# Patient Record
Sex: FEMALE | Race: BLACK OR AFRICAN AMERICAN | Employment: PART TIME | URBAN - METROPOLITAN AREA
[De-identification: names, ages, dates, MRNs, and addresses within clinical notes are randomized per-mention and may not be internally consistent; named-entity substitution may affect disease eponyms.]

---

## 2017-01-05 ENCOUNTER — ANESTHESIA EVENT (OUTPATIENT)
Dept: ENDOSCOPY | Age: 57
End: 2017-01-05
Payer: COMMERCIAL

## 2017-01-06 ENCOUNTER — ANESTHESIA (OUTPATIENT)
Dept: ENDOSCOPY | Age: 57
End: 2017-01-06
Payer: COMMERCIAL

## 2017-01-06 ENCOUNTER — SURGERY (OUTPATIENT)
Age: 57
End: 2017-01-06

## 2017-01-06 PROCEDURE — 74011000250 HC RX REV CODE- 250

## 2017-01-06 PROCEDURE — 74011250636 HC RX REV CODE- 250/636

## 2017-01-06 RX ORDER — LIDOCAINE HYDROCHLORIDE 20 MG/ML
INJECTION, SOLUTION EPIDURAL; INFILTRATION; INTRACAUDAL; PERINEURAL AS NEEDED
Status: DISCONTINUED | OUTPATIENT
Start: 2017-01-06 | End: 2017-01-06 | Stop reason: HOSPADM

## 2017-01-06 RX ORDER — PROPOFOL 10 MG/ML
INJECTION, EMULSION INTRAVENOUS AS NEEDED
Status: DISCONTINUED | OUTPATIENT
Start: 2017-01-06 | End: 2017-01-06 | Stop reason: HOSPADM

## 2017-01-06 RX ADMIN — PROPOFOL 20 MG: 10 INJECTION, EMULSION INTRAVENOUS at 09:55

## 2017-01-06 RX ADMIN — PROPOFOL 20 MG: 10 INJECTION, EMULSION INTRAVENOUS at 09:42

## 2017-01-06 RX ADMIN — PROPOFOL 20 MG: 10 INJECTION, EMULSION INTRAVENOUS at 09:59

## 2017-01-06 RX ADMIN — PROPOFOL 20 MG: 10 INJECTION, EMULSION INTRAVENOUS at 10:03

## 2017-01-06 RX ADMIN — PROPOFOL 20 MG: 10 INJECTION, EMULSION INTRAVENOUS at 09:47

## 2017-01-06 RX ADMIN — LIDOCAINE HYDROCHLORIDE 60 MG: 20 INJECTION, SOLUTION EPIDURAL; INFILTRATION; INTRACAUDAL; PERINEURAL at 09:31

## 2017-01-06 RX ADMIN — PROPOFOL 50 MG: 10 INJECTION, EMULSION INTRAVENOUS at 09:38

## 2017-01-06 RX ADMIN — PROPOFOL 20 MG: 10 INJECTION, EMULSION INTRAVENOUS at 09:40

## 2017-01-06 RX ADMIN — PROPOFOL 20 MG: 10 INJECTION, EMULSION INTRAVENOUS at 09:44

## 2017-01-06 RX ADMIN — PROPOFOL 20 MG: 10 INJECTION, EMULSION INTRAVENOUS at 09:51

## 2017-01-06 NOTE — ANESTHESIA POSTPROCEDURE EVALUATION
Post-Anesthesia Evaluation and Assessment    Patient: Christa Roach MRN: [id number]  SSN: [id number]    YOB: 1960  Age: 61 y.o. Sex: female       Cardiovascular Function/Vital Signs  Visit Vitals    /49 (BP 1 Location: Left arm, BP Patient Position: At rest)    Pulse (!) 53    Temp 36.3 °C (97.3 °F)    Resp 16    Ht 5' 6\" (1.676 m)    Wt 95.7 kg (211 lb)    SpO2 100%    BMI 34.06 kg/m2       Patient is status post MAC anesthesia for Procedure(s):  COLONOSCOPY w/bxs w/polypectomy. Nausea/Vomiting: None    Postoperative hydration reviewed and adequate. Pain:  Pain Scale 1: Numeric (0 - 10) (01/06/17 1043)  Pain Intensity 1: 0 (01/06/17 1043)   Managed    Neurological Status: At baseline    Mental Status and Level of Consciousness: Arousable    Pulmonary Status:   O2 Device: Room air (01/06/17 1014)   Adequate oxygenation and airway patent    Complications related to anesthesia: None    Post-anesthesia assessment completed.  No concerns    Signed By: Fonda Goldmann, MD     January 6, 2017

## 2017-01-06 NOTE — ANESTHESIA PREPROCEDURE EVALUATION
Anesthetic History   No history of anesthetic complications            Review of Systems / Medical History  Patient summary reviewed and pertinent labs reviewed    Pulmonary  Within defined limits                 Neuro/Psych   Within defined limits           Cardiovascular    Hypertension          Hyperlipidemia    Exercise tolerance: >4 METS     GI/Hepatic/Renal                Endo/Other        Arthritis     Other Findings   Comments: FLU SHOT received? YES       If NO, provide reason: Pt did not want a Flu shot    Current Smoker? NO       Elective Surgery? Yes       Abstained from smoking 24 hours prior to anesthesia? N/A    Risk Factors for Postoperative nausea/vomiting:       History of postoperative nausea/vomiting? NO       Female? YES       Motion sickness? NO       Intended opioid administration for postoperative analgesia?   NO           Physical Exam    Airway  Mallampati: III  TM Distance: 4 - 6 cm  Neck ROM: normal range of motion   Mouth opening: Normal     Cardiovascular    Rhythm: regular  Rate: normal         Dental  No notable dental hx       Pulmonary  Breath sounds clear to auscultation               Abdominal  GI exam deferred       Other Findings            Anesthetic Plan    ASA: 2  Anesthesia type: MAC            Anesthetic plan and risks discussed with: Patient

## 2018-04-24 ENCOUNTER — OFFICE VISIT (OUTPATIENT)
Dept: OBGYN CLINIC | Age: 58
End: 2018-04-24

## 2018-04-24 VITALS
HEART RATE: 62 BPM | DIASTOLIC BLOOD PRESSURE: 70 MMHG | HEIGHT: 66 IN | WEIGHT: 217 LBS | BODY MASS INDEX: 34.87 KG/M2 | SYSTOLIC BLOOD PRESSURE: 153 MMHG

## 2018-04-24 DIAGNOSIS — Z01.419 WELL WOMAN EXAM WITH ROUTINE GYNECOLOGICAL EXAM: Primary | ICD-10-CM

## 2018-04-24 NOTE — PATIENT INSTRUCTIONS
Pap Test: Care Instructions  Your Care Instructions    The Pap test (also called a Pap smear) is a screening test for cancer of the cervix, which is the lower part of the uterus that opens into the vagina. The test can help your doctor find early changes in the cells that could lead to cancer. The sample of cells taken during your test has been sent to a lab so that an expert can look at the cells. It usually takes a week or two to get the results back. Follow-up care is a key part of your treatment and safety. Be sure to make and go to all appointments, and call your doctor if you are having problems. It's also a good idea to know your test results and keep a list of the medicines you take. What do the results mean? · A normal result means that the test did not find any abnormal cells in the sample. · An abnormal result can mean many things. Most of these are not cancer. The results of your test may be abnormal because:  ¨ You have an infection of the vagina or cervix, such as a yeast infection. ¨ You have an IUD (intrauterine device for birth control). ¨ You have low estrogen levels after menopause that are causing the cells to change. ¨ You have cell changes that may be a sign of precancer or cancer. The results are ranked based on how serious the changes might be. There are many other reasons why you might not get a normal result. If the results were abnormal, you may need to get another test within a few weeks or months. If the results show changes that could be a sign of cancer, you may need a test called a colposcopy, which provides a more complete view of the cervix. Sometimes the lab cannot use the sample because it does not contain enough cells or was not preserved well. If so, you may need to have the test again. This is not common, but it does happen from time to time. When should you call for help?   Watch closely for changes in your health, and be sure to contact your doctor if:  ? · You have vaginal bleeding or pain for more than 2 days after the test. It is normal to have a small amount of bleeding for a day or two after the test.   Where can you learn more? Go to http://rob-jatin.info/. Enter U338 in the search box to learn more about \"Pap Test: Care Instructions. \"  Current as of: May 12, 2017  Content Version: 11.4  © 2935-8888 [employer]. Care instructions adapted under license by [employer] (which disclaims liability or warranty for this information). If you have questions about a medical condition or this instruction, always ask your healthcare professional. [address] any warranty or liability for your use of this information.

## 2018-04-24 NOTE — PROGRESS NOTES
Subjective:   61 y.o. female for Well Woman Check. She has no concerns today. Patient's last menstrual period was 08/17/2014. Social History: not sexually active. Pertinent past medical hstory: no history of HTN, DVT, CAD, DM, liver disease, migraines or smoking. Patient Active Problem List   Diagnosis Code    Dysmenorrhea N94.6    Exposure to STD Z20.2    H/O abnormal cervical Papanicolaou smear Z87.898     Current Outpatient Prescriptions   Medication Sig Dispense Refill    ASPIRIN/ACETAMINOPHEN/CAFFEINE (GOODY'S EXTRA STRENGTH PO) Take  by mouth as needed.  simvastatin (ZOCOR) 20 mg tablet Take 20 mg by mouth nightly.  lisinopril-hydrochlorothiazide (PRINZIDE, ZESTORETIC) 20-12.5 mg per tablet Take  by mouth daily. Allergies   Allergen Reactions    Latex, Natural Rubber Other (comments)     Latex gloves causes skin irritation      Amoxicillin Rash     Past Medical History:   Diagnosis Date    Dysmenorrhea 8/19/2014    Exposure to STD 11/18/2014    H/O abnormal cervical Papanicolaou smear 6/2/2015    High cholesterol     Hypertension      Past Surgical History:   Procedure Laterality Date    COLONOSCOPY N/A 1/6/2017    COLONOSCOPY w/bxs w/polypectomy performed by Luis Soto MD at [facility] ENDOSCOPY    HX BREAST REDUCTION Bilateral years ago 10+ years   [facility] HX DILATION AND CURETTAGE       Family History   Problem Relation Age of Onset    Cancer Sister 48     BREAST    Breast Cancer Sister     Diabetes Mother     Hypertension Mother     High Cholesterol Mother     Arthritis-osteo Mother     Stroke Mother     Kidney Disease Mother      DIALYSIS     Social History   Substance Use Topics    Smoking status: Never Smoker    Smokeless tobacco: Never Used    Alcohol use Yes        ROS:  Feeling well. No dyspnea or chest pain on exertion. No abdominal pain, change in bowel habits, black or bloody stools. No urinary tract symptoms.  GYN ROS: no breast pain or new or enlarging lumps on self exam. No neurological complaints. Objective:     Visit Vitals    /70    Pulse 62    Ht 5' 6\" (1.676 m)    Wt 217 lb (98.4 kg)    LMP 08/17/2014    BMI 35.02 kg/m2     The patient appears well, alert, oriented x 3, in no distress. ENT normal.  Neck supple. No adenopathy or thyromegaly. AVRIL. Lungs are clear, good air entry, no wheezes, rhonchi or rales. S1 and S2 normal, no murmurs, regular rate and rhythm. Abdomen soft without tenderness, guarding, mass or organomegaly. Extremities show no edema, normal peripheral pulses. Neurological is normal, no focal findings. BREAST EXAM: breasts appear normal, no suspicious masses, no skin or nipple changes or axillary nodes    PELVIC EXAM: normal external genitalia, vulva, vagina, cervix, uterus and adnexa    Assessment/Plan:   well woman  Mammogram done 1/2018  pap smear  counseled on breast self exam, family planning choices and menopause  return annually or prn    ICD-10-CM ICD-9-CM    1. Well woman exam with routine gynecological exam Z01.419 V72.31 PAP IG, APTIMA HPV AND RFX 16/18,45 ([id number])   .

## 2018-04-26 LAB — HPV HIGH RISK, 507303: NEGATIVE

## 2018-04-27 LAB — PAP IMAGE GUIDED, 8900296: NORMAL

## 2019-08-19 ENCOUNTER — APPOINTMENT (OUTPATIENT)
Dept: GENERAL RADIOLOGY | Age: 59
End: 2019-08-19
Attending: EMERGENCY MEDICINE
Payer: MEDICAID

## 2019-08-19 ENCOUNTER — HOSPITAL ENCOUNTER (EMERGENCY)
Age: 59
Discharge: HOME | End: 2019-08-19
Attending: EMERGENCY MEDICINE | Admitting: EMERGENCY MEDICINE
Payer: MEDICAID

## 2019-08-19 VITALS
SYSTOLIC BLOOD PRESSURE: 141 MMHG | OXYGEN SATURATION: 100 % | DIASTOLIC BLOOD PRESSURE: 60 MMHG | HEART RATE: 56 BPM | RESPIRATION RATE: 14 BRPM | TEMPERATURE: 98.4 F

## 2019-08-19 DIAGNOSIS — R06.02 SOB (SHORTNESS OF BREATH): ICD-10-CM

## 2019-08-19 DIAGNOSIS — R07.89 CHEST PRESSURE: Primary | ICD-10-CM

## 2019-08-19 DIAGNOSIS — R53.1 WEAKNESS: ICD-10-CM

## 2019-08-19 LAB
ALBUMIN SERPL-MCNC: 3.2 G/DL (ref 3.4–5)
ALBUMIN/GLOB SERPL: 1.1 {RATIO} (ref 0.8–1.7)
ALP SERPL-CCNC: 70 U/L (ref 45–117)
ALT SERPL-CCNC: 20 U/L (ref 13–56)
ANION GAP SERPL CALC-SCNC: 7 MMOL/L (ref 3–18)
AST SERPL-CCNC: 12 U/L (ref 10–38)
BASOPHILS # BLD: 0 K/UL (ref 0–0.1)
BASOPHILS NFR BLD: 0 % (ref 0–2)
BILIRUB SERPL-MCNC: 0.2 MG/DL (ref 0.2–1)
BUN SERPL-MCNC: 9 MG/DL (ref 7–18)
BUN/CREAT SERPL: 12 (ref 12–20)
CALCIUM SERPL-MCNC: 8 MG/DL (ref 8.5–10.1)
CHLORIDE SERPL-SCNC: 110 MMOL/L (ref 100–111)
CK MB CFR SERPL CALC: NORMAL % (ref 0–4)
CK MB SERPL-MCNC: <1 NG/ML (ref 5–25)
CK SERPL-CCNC: 57 U/L (ref 26–192)
CO2 SERPL-SCNC: 28 MMOL/L (ref 21–32)
CREAT SERPL-MCNC: 0.77 MG/DL (ref 0.6–1.3)
DIFFERENTIAL METHOD BLD: ABNORMAL
EOSINOPHIL # BLD: 0.2 K/UL (ref 0–0.4)
EOSINOPHIL NFR BLD: 1 % (ref 0–5)
ERYTHROCYTE [DISTWIDTH] IN BLOOD BY AUTOMATED COUNT: 14.5 % (ref 11.6–14.5)
GLOBULIN SER CALC-MCNC: 2.8 G/DL (ref 2–4)
GLUCOSE SERPL-MCNC: 95 MG/DL (ref 74–99)
HCT VFR BLD AUTO: 46.4 % (ref 35–45)
HGB BLD-MCNC: 14.9 G/DL (ref 12–16)
LIPASE SERPL-CCNC: 155 U/L (ref 73–393)
LYMPHOCYTES # BLD: 4.3 K/UL (ref 0.9–3.6)
LYMPHOCYTES NFR BLD: 35 % (ref 21–52)
MCH RBC QN AUTO: 26.4 PG (ref 24–34)
MCHC RBC AUTO-ENTMCNC: 32.1 G/DL (ref 31–37)
MCV RBC AUTO: 82.1 FL (ref 74–97)
MONOCYTES # BLD: 0.6 K/UL (ref 0.05–1.2)
MONOCYTES NFR BLD: 5 % (ref 3–10)
NEUTS SEG # BLD: 7.4 K/UL (ref 1.8–8)
NEUTS SEG NFR BLD: 59 % (ref 40–73)
PLATELET # BLD AUTO: 295 K/UL (ref 135–420)
PMV BLD AUTO: 10 FL (ref 9.2–11.8)
POTASSIUM SERPL-SCNC: 3.2 MMOL/L (ref 3.5–5.5)
PROT SERPL-MCNC: 6 G/DL (ref 6.4–8.2)
RBC # BLD AUTO: 5.65 M/UL (ref 4.2–5.3)
SODIUM SERPL-SCNC: 145 MMOL/L (ref 136–145)
TROPONIN I SERPL-MCNC: 0.03 NG/ML (ref 0–0.04)
WBC # BLD AUTO: 12.5 K/UL (ref 4.6–13.2)

## 2019-08-19 PROCEDURE — 82550 ASSAY OF CK (CPK): CPT

## 2019-08-19 PROCEDURE — 71045 X-RAY EXAM CHEST 1 VIEW: CPT

## 2019-08-19 PROCEDURE — 80053 COMPREHEN METABOLIC PANEL: CPT

## 2019-08-19 PROCEDURE — 99284 EMERGENCY DEPT VISIT MOD MDM: CPT

## 2019-08-19 PROCEDURE — 85025 COMPLETE CBC W/AUTO DIFF WBC: CPT

## 2019-08-19 PROCEDURE — 83690 ASSAY OF LIPASE: CPT

## 2019-08-19 PROCEDURE — 93005 ELECTROCARDIOGRAM TRACING: CPT

## 2019-08-19 NOTE — ED TRIAGE NOTES
Pt arrived to ed via ems for c/o chest pain. Pt was seen at [facility] urgent care and told to come here for treatment. Pt reports worsening chest pressure x 2 days. Pt received Aspirin 324mg PO and one Nitro sublingual tablet prior to arrival with positive effects.   Reports chest pain 2/10 upon arrival.

## 2019-08-19 NOTE — ED PROVIDER NOTES
EMERGENCY DEPARTMENT HISTORY AND PHYSICAL EXAM    Date: 8/19/2019  Patient Name: Jacquelynne Epley    History of Presenting Illness     Chief Complaint   Patient presents with    Chest Pain         History Provided By: Patient    Chief Complaint: chest pain  Duration: 2 Days  Timing:  Intermittent and Improving  Location: across entire chest  Quality: Pressure  Severity: Mild  Modifying Factors: none  Associated Symptoms: shortness of breath, fatigue, weakness      HPI: Jacquelynne Epley is a 60 y.o. female with a PMH of HTN, High cholesterol, Dysmenorrhea who presents to the ER via EMS from urgent care for evaluation of chest pressure. Patient states her symptoms have been going on during the weekend and today. They have been intermittent in nature. She also reports associated shortness of breath, fatigue and weakness. Patient states she was given aspirin prior to ER arrival and she feels better at this time. She denied any recent sick contacts and has not had any recent fevers or coughs. She has not had any recent travel and denied any weight gain or leg swelling. She denied all other symptoms and complaints. PCP: Tatianna Kraus MD    Current Outpatient Medications   Medication Sig Dispense Refill    ASPIRIN/ACETAMINOPHEN/CAFFEINE (GOODY'S EXTRA STRENGTH PO) Take  by mouth as needed.  simvastatin (ZOCOR) 20 mg tablet Take 20 mg by mouth nightly.  lisinopril-hydrochlorothiazide (PRINZIDE, ZESTORETIC) 20-12.5 mg per tablet Take  by mouth daily.          Past History     Past Medical History:  Past Medical History:   Diagnosis Date    Dysmenorrhea 8/19/2014    Exposure to STD 11/18/2014    H/O abnormal cervical Papanicolaou smear 6/2/2015    High cholesterol     Hypertension        Past Surgical History:  Past Surgical History:   Procedure Laterality Date    COLONOSCOPY N/A 1/6/2017    COLONOSCOPY w/bxs w/polypectomy performed by Hoyt Gitelman, MD at [address] REDUCTION Bilateral years ago 10+ years    HX DILATION AND CURETTAGE         Family History:  Family History   Problem Relation Age of Onset    Cancer Sister 48        BREAST    Breast Cancer Sister     Diabetes Mother     Hypertension Mother     High Cholesterol Mother     Arthritis-osteo Mother     Stroke Mother     Kidney Disease Mother         DIALYSIS       Social History:  Social History     Tobacco Use    Smoking status: Never Smoker    Smokeless tobacco: Never Used   Substance Use Topics    Alcohol use: Yes    Drug use: No     Comment: denies       Allergies: Allergies   Allergen Reactions    Latex, Natural Rubber Other (comments)     Latex gloves causes skin irritation      Amoxicillin Rash         Review of Systems   Review of Systems   Constitutional: Positive for fatigue. Negative for chills and fever. HENT: Negative. Negative for sore throat. Eyes: Negative. Respiratory: Positive for shortness of breath. Negative for cough. Cardiovascular: Negative for chest pain and palpitations. Gastrointestinal: Negative for abdominal pain, nausea and vomiting. Genitourinary: Negative for dysuria. Musculoskeletal: Negative. Skin: Negative. Neurological: Positive for weakness. Negative for dizziness, light-headedness and headaches. Psychiatric/Behavioral: Negative. All other systems reviewed and are negative. Physical Exam     Vitals:    08/19/19 1716 08/19/19 1800   BP: 137/61 141/60   Pulse:  (!) 56   Resp: 14 14   Temp: 98.4 °F (36.9 °C)    SpO2: 100% 100%     Physical Exam   Constitutional: She is oriented to person, place, and time. She appears well-developed and well-nourished. No distress. HENT:   Head: Normocephalic and atraumatic. Mouth/Throat: Oropharynx is clear and moist.   Eyes: Conjunctivae are normal. No scleral icterus. Neck: Neck supple. No JVD present. No tracheal deviation present.    Cardiovascular: Normal rate, regular rhythm and normal heart sounds. No murmur heard. Pulmonary/Chest: Effort normal and breath sounds normal. No respiratory distress. She has no wheezes. She has no rales. She exhibits no tenderness. Speaks in full sentences   Abdominal: Soft. She exhibits no distension. There is no tenderness. There is no rebound and no guarding. Musculoskeletal: She exhibits no edema or tenderness. Neurological: She is alert and oriented to person, place, and time. She has normal strength. Gait normal. GCS eye subscore is 4. GCS verbal subscore is 5. GCS motor subscore is 6. Skin: Skin is warm and dry. She is not diaphoretic. Psychiatric: She has a normal mood and affect. Nursing note and vitals reviewed. Diagnostic Study Results     Labs -     Recent Results (from the past 12 hour(s))   CBC WITH AUTOMATED DIFF    Collection Time: 08/19/19  5:16 PM   Result Value Ref Range    WBC 12.5 4.6 - 13.2 K/uL    RBC 5.65 (H) 4.20 - 5.30 M/uL    HGB 14.9 12.0 - 16.0 g/dL    HCT 46.4 (H) 35.0 - 45.0 %    MCV 82.1 74.0 - 97.0 FL    MCH 26.4 24.0 - 34.0 PG    MCHC 32.1 31.0 - 37.0 g/dL    RDW 14.5 11.6 - 14.5 %    PLATELET 623 139 - 189 K/uL    MPV 10.0 9.2 - 11.8 FL    NEUTROPHILS 59 40 - 73 %    LYMPHOCYTES 35 21 - 52 %    MONOCYTES 5 3 - 10 %    EOSINOPHILS 1 0 - 5 %    BASOPHILS 0 0 - 2 %    ABS. NEUTROPHILS 7.4 1.8 - 8.0 K/UL    ABS. LYMPHOCYTES 4.3 (H) 0.9 - 3.6 K/UL    ABS. MONOCYTES 0.6 0.05 - 1.2 K/UL    ABS. EOSINOPHILS 0.2 0.0 - 0.4 K/UL    ABS.  BASOPHILS 0.0 0.0 - 0.1 K/UL    DF AUTOMATED     METABOLIC PANEL, COMPREHENSIVE    Collection Time: 08/19/19  5:16 PM   Result Value Ref Range    Sodium 145 136 - 145 mmol/L    Potassium 3.2 (L) 3.5 - 5.5 mmol/L    Chloride 110 100 - 111 mmol/L    CO2 28 21 - 32 mmol/L    Anion gap 7 3.0 - 18 mmol/L    Glucose 95 74 - 99 mg/dL    BUN 9 7.0 - 18 MG/DL    Creatinine 0.77 0.6 - 1.3 MG/DL    BUN/Creatinine ratio 12 12 - 20      GFR est AA >60 >60 ml/min/1.73m2    GFR est non-AA >60 >60 ml/min/1.73m2 Calcium 8.0 (L) 8.5 - 10.1 MG/DL    Bilirubin, total 0.2 0.2 - 1.0 MG/DL    ALT (SGPT) 20 13 - 56 U/L    AST (SGOT) 12 10 - 38 U/L    Alk. phosphatase 70 45 - 117 U/L    Protein, total 6.0 (L) 6.4 - 8.2 g/dL    Albumin 3.2 (L) 3.4 - 5.0 g/dL    Globulin 2.8 2.0 - 4.0 g/dL    A-G Ratio 1.1 0.8 - 1.7     LIPASE    Collection Time: 08/19/19  5:16 PM   Result Value Ref Range    Lipase 155 73 - 393 U/L   CARDIAC PANEL,(CK, CKMB & TROPONIN)    Collection Time: 08/19/19  5:16 PM   Result Value Ref Range    CK 57 26 - 192 U/L    CK - MB <1.0 <3.6 ng/ml    CK-MB Index  0.0 - 4.0 %     CALCULATION NOT PERFORMED WHEN RESULT IS BELOW LINEAR LIMIT    Troponin-I, QT 0.03 0.0 - 0.045 NG/ML   EKG, 12 LEAD, INITIAL    Collection Time: 08/19/19  5:29 PM   Result Value Ref Range    Ventricular Rate 54 BPM    Atrial Rate 54 BPM    P-R Interval 158 ms    QRS Duration 82 ms    Q-T Interval 424 ms    QTC Calculation (Bezet) 402 ms    Calculated P Axis 33 degrees    Calculated R Axis -17 degrees    Calculated T Axis 102 degrees    Diagnosis       Sinus bradycardia  Voltage criteria for left ventricular hypertrophy  T wave abnormality, consider lateral ischemia  Abnormal ECG  No previous ECGs available         Radiologic Studies -   XR CHEST PORT   Final Result   IMPRESSION:      1. Radiographically stable unremarkable portable chest since 9-2012. CT Results  (Last 48 hours)    None        CXR Results  (Last 48 hours)               08/19/19 1743  XR CHEST PORT Final result    Impression:  IMPRESSION:       1. Radiographically stable unremarkable portable chest since 9-2012. Narrative:  CHEST AP PORTABLE, 8/19/2019, 1735 hours        Technique:   Single frontal view obtained. No recent prior exams. Comparison prior exam, 9/4/2012 , . Findings: There is no appreciable interval change. The lungs are normally inflated. The   right lung  The left lung    lungs   appear clear.     No evident consolidation or pneumothorax. The  right costophrenic angle  The left costophrenic angle     costophrenic   sulci   appear sharp. .       The cardiac silhouette is unremarkable in caliber and contour. The mediastinum   appears unremarkable  including aortic contour. The pulmonary vascularity is   within normal limits. Support catheters/tubes:   None. Misc:    Large habitus                       Medical Decision Making   I am the first provider for this patient. I reviewed the vital signs, available nursing notes, past medical history, past surgical history, family history and social history. Vital Signs-Reviewed the patient's vital signs. Records Reviewed: Nursing Notes, Old Medical Records, Previous electrocardiograms, Previous Radiology Studies and Previous Laboratory Studies     1012 PM  78-year-old female who presents to the ER via EMS from urgent care for evaluation of chest pressure and shortness of breath. Patient states her symptoms have been going on for several days. She also reports associated weakness and fatigue. Did not go to work this afternoon due to not feeling well. Denied any recent fevers, cough, congestion or other symptoms. EKG shows sinus bradycardia, rate 54 with no acute ST or T wave abnormalities. Patient with no physical complaints on exam states she is feeling well at this time while she is resting. Denied any recent weight gain or leg swelling. No recent trips. Chest x-ray with no acute process. Cardiac work-up and other labs pending at this time. Rosie Hunt PA-C     6:49 PM  Cardiac work-up negative. Labs reviewed and noted to be stable. Patient noted to have a heart score of 2. Low clinical suspicion for true cardiac etiology at this time. Discussed all results with patient. We will have her follow-up with her primary care provider this week and discussed strict return precautions if symptoms worsen. Patient in agreement with plan. Patient stable for discharge at this time. All questions answered and patient in agreement with plan of care. Will plan for discharge. Fam Hanna PA-C    Disposition:  discharged    DISCHARGE NOTE:       Care plan outlined and precautions discussed. Patient has no new complaints, changes, or physical findings. Results of labs, xray were reviewed with the patient. All medications were reviewed with the patient; will d/c home with n/a. All of pt's questions and concerns were addressed. Patient was instructed and agrees to follow up with pcp, as well as to return to the ED upon further deterioration. Patient is ready to go home. Follow-up Information     Follow up With Specialties Details Why [address] [facility] EMERGENCY DEPT Emergency Medicine  If symptoms worsen [address]  [phone]    Serene Silva MD Internal Medicine Call in 1 day As needed for ER follow up for chest pressure and shortness of breath [facility]  [phone]            Current Discharge Medication List      CONTINUE these medications which have NOT CHANGED    Details   ASPIRIN/ACETAMINOPHEN/CAFFEINE (GOODY'S EXTRA STRENGTH PO) Take  by mouth as needed. simvastatin (ZOCOR) 20 mg tablet Take 20 mg by mouth nightly. lisinopril-hydrochlorothiazide (PRINZIDE, ZESTORETIC) 20-12.5 mg per tablet Take  by mouth daily. Provider Notes (Medical Decision Making):     Procedures:  Procedures        Diagnosis     Clinical Impression:   1. Chest pressure    2. SOB (shortness of breath)    3.  Weakness

## 2019-08-19 NOTE — DISCHARGE INSTRUCTIONS

## 2019-08-19 NOTE — LETTER
[facility] EMERGENCY DEPT 
[address] 
[address] [facility] [address] [phone] Work/School Note Date: 8/19/2019 To Whom It May concern: 
 
Carol Pineda was seen and treated today in the emergency room by the following provider(s): 
Attending Provider: Erna Tesfaye MD 
Physician Assistant: Abraham Valadez PA-C. Carol Pineda may return to work on 8/20/2019. Sincerely, Jorge A Ríos PA-C

## 2019-08-19 NOTE — ED NOTES
VSS. NAD. Pt denies pain. Agreeable to discharge instructions. I have reviewed discharge instructions with the patient. The patient verbalized understanding.     Patient armband removed and shredded

## 2019-08-21 LAB
ATRIAL RATE: 54 BPM
CALCULATED P AXIS, ECG09: 33 DEGREES
CALCULATED R AXIS, ECG10: -17 DEGREES
CALCULATED T AXIS, ECG11: 102 DEGREES
DIAGNOSIS, 93000: NORMAL
P-R INTERVAL, ECG05: 158 MS
Q-T INTERVAL, ECG07: 424 MS
QRS DURATION, ECG06: 82 MS
QTC CALCULATION (BEZET), ECG08: 402 MS
VENTRICULAR RATE, ECG03: 54 BPM